# Patient Record
Sex: MALE | URBAN - METROPOLITAN AREA
[De-identification: names, ages, dates, MRNs, and addresses within clinical notes are randomized per-mention and may not be internally consistent; named-entity substitution may affect disease eponyms.]

---

## 2022-07-25 ENCOUNTER — TRANSFERRED RECORDS (OUTPATIENT)
Dept: HEALTH INFORMATION MANAGEMENT | Facility: CLINIC | Age: 27
End: 2022-07-25

## 2022-07-28 ENCOUNTER — MEDICAL CORRESPONDENCE (OUTPATIENT)
Dept: HEALTH INFORMATION MANAGEMENT | Facility: CLINIC | Age: 27
End: 2022-07-28

## 2022-07-29 ENCOUNTER — TRANSCRIBE ORDERS (OUTPATIENT)
Dept: OTHER | Age: 27
End: 2022-07-29

## 2022-07-29 DIAGNOSIS — H18.609 KERATOCONUS: Primary | ICD-10-CM

## 2022-08-01 ENCOUNTER — OFFICE VISIT (OUTPATIENT)
Dept: OPHTHALMOLOGY | Facility: CLINIC | Age: 27
End: 2022-08-01
Attending: OPHTHALMOLOGY
Payer: COMMERCIAL

## 2022-08-01 DIAGNOSIS — H10.13 ALLERGIC CONJUNCTIVITIS OF BOTH EYES: ICD-10-CM

## 2022-08-01 DIAGNOSIS — H18.623 UNSTABLE KERATOCONUS OF BOTH EYES: Primary | ICD-10-CM

## 2022-08-01 PROCEDURE — G0463 HOSPITAL OUTPT CLINIC VISIT: HCPCS | Mod: 25

## 2022-08-01 PROCEDURE — 99204 OFFICE O/P NEW MOD 45 MIN: CPT | Performed by: OPHTHALMOLOGY

## 2022-08-01 PROCEDURE — 92025 CPTRIZED CORNEAL TOPOGRAPHY: CPT | Performed by: OPHTHALMOLOGY

## 2022-08-01 RX ORDER — OLOPATADINE HYDROCHLORIDE 1 MG/ML
1 SOLUTION/ DROPS OPHTHALMIC 2 TIMES DAILY
Qty: 5 ML | Refills: 11 | Status: SHIPPED | OUTPATIENT
Start: 2022-08-01

## 2022-08-01 RX ORDER — DEXTROAMPHETAMINE SACCHARATE, AMPHETAMINE ASPARTATE MONOHYDRATE, DEXTROAMPHETAMINE SULFATE AND AMPHETAMINE SULFATE 3.75; 3.75; 3.75; 3.75 MG/1; MG/1; MG/1; MG/1
15 CAPSULE, EXTENDED RELEASE ORAL
COMMUNITY

## 2022-08-01 ASSESSMENT — SLIT LAMP EXAM - LIDS
COMMENTS: NORMAL
COMMENTS: NORMAL

## 2022-08-01 ASSESSMENT — CONF VISUAL FIELD
OD_NORMAL: 1
OS_NORMAL: 1
METHOD: COUNTING FINGERS

## 2022-08-01 ASSESSMENT — EXTERNAL EXAM - RIGHT EYE: OD_EXAM: NORMAL

## 2022-08-01 ASSESSMENT — EXTERNAL EXAM - LEFT EYE: OS_EXAM: NORMAL

## 2022-08-01 ASSESSMENT — TONOMETRY
IOP_METHOD: TONOPEN
OD_IOP_MMHG: 16
OS_IOP_MMHG: 16

## 2022-08-01 ASSESSMENT — CUP TO DISC RATIO
OD_RATIO: 0.4
OS_RATIO: 0.4

## 2022-08-01 ASSESSMENT — VISUAL ACUITY
OS_PH_CC: 20/25
OD_PH_CC: 20/25
OD_PH_CC+: -2
OS_CC: 20/25
CORRECTION_TYPE: GLASSES
OS_CC+: -2
OD_CC: 20/30
METHOD: SNELLEN - LINEAR
OS_PH_CC+: +2

## 2022-08-01 ASSESSMENT — REFRACTION_WEARINGRX
OS_SPHERE: -2.00
OS_AXIS: 105
OD_CYLINDER: +1.50
OD_AXIS: 088
OD_SPHERE: -2.00
SPECS_TYPE: SVL
OS_CYLINDER: +1.00

## 2022-08-01 NOTE — NURSING NOTE
Chief Complaints and History of Present Illnesses   Patient presents with     Keratoconus Evaluation     Chief Complaint(s) and History of Present Illness(es)     Keratoconus Evaluation               Comments     Pt states vision has been stable over the past few years. No eye pain today.  No flashes or floaters. No redness or dryness.     LUCAS Tamez August 1, 2022 1:45 PM

## 2022-08-01 NOTE — PROGRESS NOTES
Chief complaint   Referred for suspicion of keratoconus    Referring Provider: Robbin Dillon 26 year old male Pt states vision has been stable over the past few years. No eye pain today. No flashes or floaters. No redness or dryness.       Past ocular history   Prior eye surgery/laser/Trauma: No  CTL wearer:No  Glasses : glasses  Family Hx of eye disease: No    PMH   History reviewed. No pertinent past medical history.    PSH   History reviewed. No pertinent surgical history.    Meds   **  Current Outpatient Medications   Medication     amphetamine-dextroamphetamine (ADDERALL XR) 15 MG 24 hr capsule     No current facility-administered medications for this visit.       Labs   -    Imaging   Madhav    Topography thinnest CT OD Kmax OD Thinnest CT OS Kmax OS   08/1/22 510 47.9 509 48.1                   Drops Currently Taking   none    Assessment/Plan   # Keratoconus each eye  #Allegic conjunctivitis ou     -patient does not report recent change in the vision. He has chronic allergies which might be contributing to the GLENYS.    -Topography showing left eye worse than right eye.    -CXL discussed with the patient but rather wait and document a progression before proceeding with sx    Plan:  -Monitor q6M  -Patanol prescribed for allergy  -advise not to rub his eyes  -Follow with optom for hard contact lens fitting/refraction      Follow up:  Oph:Follow Up with topography, and follow with optom for contact lens testing in 2months      Attending Physician Attestation:  Complete documentation of historical and exam elements from today's encounter can be found in the full encounter summary report (not reduplicated in this progress note).  I personally obtained the chief complaint(s) and history of present illness.  I confirmed and edited as necessary the review of systems, past medical/surgical history, family history, social history, and examination findings as documented by others; and I examined the  patient myself.  I personally reviewed the relevant tests, images, and reports as documented above.  I formulated and edited as necessary the assessment and plan and discussed the findings and management plan with the patient and family. - David Mercado MD  /

## 2022-08-04 PROBLEM — H10.13 ALLERGIC CONJUNCTIVITIS OF BOTH EYES: Status: ACTIVE | Noted: 2022-08-04

## 2022-08-04 PROBLEM — H18.623 UNSTABLE KERATOCONUS OF BOTH EYES: Status: ACTIVE | Noted: 2022-08-04

## 2022-08-21 ENCOUNTER — HEALTH MAINTENANCE LETTER (OUTPATIENT)
Age: 27
End: 2022-08-21

## 2022-10-17 ENCOUNTER — OFFICE VISIT (OUTPATIENT)
Dept: OPTOMETRY | Facility: CLINIC | Age: 27
End: 2022-10-17

## 2022-10-17 DIAGNOSIS — H18.603 KERATOCONUS OF BOTH EYES: Primary | ICD-10-CM

## 2022-10-17 ASSESSMENT — REFRACTION_CURRENTRX
OS_BRAND: ONEFIT
OS_SPHERE: -1.50
OS_BRAND: BIOFINITY TORIC
OD_DIAMETER: 15.2
OD_SPHERE: -2.00
OS_DIAMETER: 15.2
OS_DIAMETER: 14.5
OD_BRAND: ONEFIT
OS_CYLINDER: -1.25
OS_BASECURVE: 8.0
OS_SPHERE: -1.00
OD_AXIS: 180
OD_BRAND: BIOFINITY TORIC
OD_SPHERE: -1.00
OD_DIAMETER: 14.5
OD_CYLINDER: -2.25
OD_BASECURVE: 8.7
OS_AXIS: 010
OD_BASECURVE: 7.9
OS_BASECURVE: 8.7

## 2022-10-17 ASSESSMENT — REFRACTION_MANIFEST
OD_CYLINDER: +2.25
OS_AXIS: 100
OD_SPHERE: -3.00
OS_CYLINDER: +1.25
OS_SPHERE: -2.25
OD_AXIS: 090

## 2022-10-17 ASSESSMENT — VISUAL ACUITY
CORRECTION_TYPE: CONTACTS
OD_CC: 20/20-3
METHOD: SNELLEN - LINEAR
OS_CC: 20/20-2

## 2022-10-17 ASSESSMENT — SLIT LAMP EXAM - LIDS
COMMENTS: NORMAL
COMMENTS: NORMAL

## 2022-10-17 ASSESSMENT — CONF VISUAL FIELD
OS_INFERIOR_TEMPORAL_RESTRICTION: 0
OS_SUPERIOR_TEMPORAL_RESTRICTION: 0
OS_INFERIOR_NASAL_RESTRICTION: 0
OS_SUPERIOR_NASAL_RESTRICTION: 0
OS_NORMAL: 1

## 2022-10-17 ASSESSMENT — REFRACTION_WEARINGRX
OD_AXIS: 095
OD_SPHERE: -3.00
OS_AXIS: 100
OD_CYLINDER: +2.25
OS_CYLINDER: +1.25
OS_SPHERE: -2.25

## 2022-10-17 ASSESSMENT — EXTERNAL EXAM - RIGHT EYE: OD_EXAM: NORMAL

## 2022-10-17 ASSESSMENT — EXTERNAL EXAM - LEFT EYE: OS_EXAM: NORMAL

## 2022-10-17 NOTE — PROGRESS NOTES
A/P  1.) Mild keratoconus OU  -Very mild, inferior steepening left eye>right eye but still with regular WTR cyl pattern  -Seeing well in glasses, BCVA 20/25 right, 20/20 left  -No previous h/o CL wear  -Was eval'd by cornea dept here but recommended for 6 month eval prior to CXL consideration  -Poor vision in soft torics today (likely influenced by DILLON's)  -Excellent vision in scleral lens but does require significant front surface toric    Reviewed findings with pt and expectations for vision correction. He is very motivated to do CXL asap if it can prevent vision from getting worse. He did ask about possible LASIK combined with CXL. I would recommend he get a second opinion on whether he can get it done ASAP or now. No soft lenses recommended at this time (reconsider s/p CXL), but can consider scleral lens fit at any time. I did review he largely sees well in glasses so there is not need for hard CL fit at this time unless he desires to wear contacts or prefers the crispness from trial frame today.     Hold on CL's pending CXL second opinion. May be candidate for CXL + refractive surgery. F/u here after CXL

## 2022-11-21 ENCOUNTER — HEALTH MAINTENANCE LETTER (OUTPATIENT)
Age: 27
End: 2022-11-21

## 2023-02-01 ENCOUNTER — OFFICE VISIT (OUTPATIENT)
Dept: OPHTHALMOLOGY | Facility: CLINIC | Age: 28
End: 2023-02-01
Attending: OPHTHALMOLOGY
Payer: COMMERCIAL

## 2023-02-01 DIAGNOSIS — H18.623 UNSTABLE KERATOCONUS OF BOTH EYES: Primary | ICD-10-CM

## 2023-02-01 PROCEDURE — G0463 HOSPITAL OUTPT CLINIC VISIT: HCPCS

## 2023-02-01 PROCEDURE — 99213 OFFICE O/P EST LOW 20 MIN: CPT | Performed by: OPHTHALMOLOGY

## 2023-02-01 PROCEDURE — 92025 CPTRIZED CORNEAL TOPOGRAPHY: CPT | Performed by: OPHTHALMOLOGY

## 2023-02-01 RX ORDER — METHYLPHENIDATE HYDROCHLORIDE 10 MG/1
TABLET ORAL
COMMUNITY
Start: 2023-01-29

## 2023-02-01 ASSESSMENT — CONF VISUAL FIELD
OS_INFERIOR_TEMPORAL_RESTRICTION: 0
OS_NORMAL: 1
OD_NORMAL: 1
OS_SUPERIOR_TEMPORAL_RESTRICTION: 0
OD_INFERIOR_TEMPORAL_RESTRICTION: 0
OS_INFERIOR_NASAL_RESTRICTION: 0
OD_INFERIOR_NASAL_RESTRICTION: 0
METHOD: COUNTING FINGERS
OS_SUPERIOR_NASAL_RESTRICTION: 0
OD_SUPERIOR_NASAL_RESTRICTION: 0
OD_SUPERIOR_TEMPORAL_RESTRICTION: 0

## 2023-02-01 ASSESSMENT — REFRACTION_WEARINGRX
OD_SPHERE: -3.00
OS_AXIS: 100
OD_AXIS: 090
OS_CYLINDER: +1.25
OD_CYLINDER: +2.25
OS_SPHERE: -2.25

## 2023-02-01 ASSESSMENT — VISUAL ACUITY
OS_CC: 20/20
CORRECTION_TYPE: GLASSES
METHOD: SNELLEN - LINEAR
OD_CC: 20/20

## 2023-02-01 ASSESSMENT — TONOMETRY
IOP_METHOD: ICARE
OD_IOP_MMHG: 15
OS_IOP_MMHG: 18

## 2023-02-01 ASSESSMENT — EXTERNAL EXAM - LEFT EYE: OS_EXAM: NORMAL

## 2023-02-01 ASSESSMENT — SLIT LAMP EXAM - LIDS
COMMENTS: NORMAL
COMMENTS: NORMAL

## 2023-02-01 ASSESSMENT — EXTERNAL EXAM - RIGHT EYE: OD_EXAM: NORMAL

## 2023-02-01 NOTE — NURSING NOTE
Chief Complaints and History of Present Illnesses   Patient presents with     Keratoconus Follow Up     Chief Complaint(s) and History of Present Illness(es)     Keratoconus Follow Up            Laterality: both eyes    Associated symptoms: Negative for flashes and floaters    Treatments tried: no treatments    Pain scale: 0/10          Comments    Keratoconus follow up.  The patient notes his updated glasses are working well.  He reports he tried contact lenses and he couldn't get the scleral lenses in the eye.  Inna Rose, COA, COA 12:35 PM 02/01/2023

## 2023-02-01 NOTE — PROGRESS NOTES
Chief complaint   Referred for suspicion of keratoconus    Referring Provider: Robbin Dillon 27 year old male Pt states vision has been stable over the past few years. No eye pain today. No flashes or floaters. No redness or dryness.     Interval hx Keratoconus follow up. The patient notes his updated glasses are working well. He reports he tried contact lenses and he couldn't get the scleral lenses in the eye.       Past ocular history   Prior eye surgery/laser/Trauma: No  CTL wearer:No  Glasses : glasses  Family Hx of eye disease: No    PMH   History reviewed. No pertinent past medical history.    PSH   History reviewed. No pertinent surgical history.    Meds   **  Current Outpatient Medications   Medication     amphetamine-dextroamphetamine (ADDERALL XR) 15 MG 24 hr capsule     methylphenidate (RITALIN) 10 MG tablet     olopatadine (PATANOL) 0.1 % ophthalmic solution     No current facility-administered medications for this visit.       Labs   -    Imaging   Madhav    Topography thinnest CT OD Kmax OD Thinnest CT OS Kmax OS   08/1/22 510 47.9 509 48.1                   Drops Currently Taking   none    Assessment/Plan   # unstable Keratoconus each eye stage 1  #Allegic conjunctivitis ou     -patient does not report recent change in the vision. He has chronic allergies which might be contributing to the GLENYS.    -Topography showing left eye worse than right eye. But same since last exam    -CXL discussed with the patient but rather wait and document a progression before proceeding with sx ( patient not candidate for Rx sx, but ICL can be considered if stable for karl years    Plan:  -repeat imaging in 1 year  -advise not to rub his eyes      Follow up:  Oph: yearly exam with madhav    Attending Physician Attestation:  Complete documentation of historical and exam elements from today's encounter can be found in the full encounter summary report (not reduplicated in this progress note).  I personally  obtained the chief complaint(s) and history of present illness.  I confirmed and edited as necessary the review of systems, past medical/surgical history, family history, social history, and examination findings as documented by others; and I examined the patient myself.  I personally reviewed the relevant tests, images, and reports as documented above.  I formulated and edited as necessary the assessment and plan and discussed the findings and management plan with the patient and family. - David Mercado MD

## 2023-09-17 ENCOUNTER — HEALTH MAINTENANCE LETTER (OUTPATIENT)
Age: 28
End: 2023-09-17

## 2024-11-09 ENCOUNTER — HEALTH MAINTENANCE LETTER (OUTPATIENT)
Age: 29
End: 2024-11-09

## 2025-02-27 ENCOUNTER — TELEPHONE (OUTPATIENT)
Dept: OPHTHALMOLOGY | Facility: CLINIC | Age: 30
End: 2025-02-27
Payer: COMMERCIAL

## 2025-02-27 NOTE — TELEPHONE ENCOUNTER
[facility] Call Center    Phone Message    May a detailed message be left on voicemail: yes     Reason for Call: Other: pt is asking if he can get his glasses RX at upcoming appt, Please contact pt to discuss options. Thank you      Action Taken: Message routed to:  [facility] ([facility]): eye    Travel Screening: Not Applicable     Date of Service:

## 2025-03-10 ENCOUNTER — OFFICE VISIT (OUTPATIENT)
Dept: OPHTHALMOLOGY | Facility: CLINIC | Age: 30
End: 2025-03-10
Attending: OPHTHALMOLOGY
Payer: COMMERCIAL

## 2025-03-10 DIAGNOSIS — H18.623 UNSTABLE KERATOCONUS OF BOTH EYES: Primary | ICD-10-CM

## 2025-03-10 PROCEDURE — 92015 DETERMINE REFRACTIVE STATE: CPT

## 2025-03-10 PROCEDURE — 99212 OFFICE O/P EST SF 10 MIN: CPT | Performed by: OPHTHALMOLOGY

## 2025-03-10 PROCEDURE — 92025 CPTRIZED CORNEAL TOPOGRAPHY: CPT | Performed by: OPHTHALMOLOGY

## 2025-03-10 ASSESSMENT — EXTERNAL EXAM - LEFT EYE: OS_EXAM: NORMAL

## 2025-03-10 ASSESSMENT — REFRACTION_WEARINGRX
OD_CYLINDER: +2.25
OS_SPHERE: -2.25
OD_AXIS: 090
OS_CYLINDER: +1.25
OD_SPHERE: -3.00
OS_AXIS: 100

## 2025-03-10 ASSESSMENT — CONF VISUAL FIELD
OS_INFERIOR_NASAL_RESTRICTION: 0
OD_SUPERIOR_NASAL_RESTRICTION: 0
METHOD: COUNTING FINGERS
OD_NORMAL: 1
OS_SUPERIOR_TEMPORAL_RESTRICTION: 0
OD_INFERIOR_TEMPORAL_RESTRICTION: 0
OD_SUPERIOR_TEMPORAL_RESTRICTION: 0
OS_NORMAL: 1
OS_SUPERIOR_NASAL_RESTRICTION: 0
OS_INFERIOR_TEMPORAL_RESTRICTION: 0
OD_INFERIOR_NASAL_RESTRICTION: 0

## 2025-03-10 ASSESSMENT — VISUAL ACUITY
OD_CC: 20/25
OD_CC+: -1
CORRECTION_TYPE: GLASSES
METHOD: SNELLEN - LINEAR
OS_CC: 20/25

## 2025-03-10 ASSESSMENT — EXTERNAL EXAM - RIGHT EYE: OD_EXAM: NORMAL

## 2025-03-10 ASSESSMENT — SLIT LAMP EXAM - LIDS
COMMENTS: NORMAL
COMMENTS: NORMAL

## 2025-03-10 ASSESSMENT — REFRACTION_MANIFEST
OS_CYLINDER: +1.75
OS_SPHERE: -2.25
OD_SPHERE: -3.25
OD_CYLINDER: +2.75
OD_AXIS: 088
OS_AXIS: 105

## 2025-03-10 ASSESSMENT — TONOMETRY
OD_IOP_MMHG: 13
OS_IOP_MMHG: 14
IOP_METHOD: ICARE

## 2025-03-10 NOTE — PROGRESS NOTES
Chief complaint   Referred for suspicion of keratoconus    Referring Provider: Robbin Dillon 29 year old male Pt states vision has been stable over the past few years. No eye pain today. No flashes or floaters. No redness or dryness.     Interval hx 03/10/2025  Chief Complaint(s) and History of Present Illness(es)       Annual Eye Exam    In both eyes.  Since onset it is gradually worsening.  Associated symptoms include Negative for eye pain, flashes and floaters.  Treatments tried include no treatments.                       Past ocular history   Prior eye surgery/laser/Trauma: No  CTL wearer:No  Glasses : glasses  Family Hx of eye disease: No    PMH   No past medical history on file.    PSH   No past surgical history on file.    Meds   **  Current Outpatient Medications   Medication Sig Dispense Refill    amphetamine-dextroamphetamine (ADDERALL XR) 15 MG 24 hr capsule Take 15 mg by mouth      methylphenidate (RITALIN) 10 MG tablet TAKE 1 TABLET BY MOUTH TWICE DAILY FOR 7 DAYS THEN MAY INCREASE TO 1 TABLET BY MOUTH THREE TIMES DAILY      olopatadine (PATANOL) 0.1 % ophthalmic solution Place 1 drop into both eyes 2 times daily 5 mL 11     No current facility-administered medications for this visit.       Labs   -    Imaging   Madhav    Topography thinnest CT OD Kmax OD Thinnest CT OS Kmax OS   08/1/22 510 47.9 509 48.1                   Drops Currently Taking   none    Assessment/Plan   # unstable Keratoconus each eye stage 1  #Allegic conjunctivitis ou     -patient does not report recent change in the vision. He has chronic allergies which might be contributing to the GLENYS.    -today madhav each eye is stable compared to 2022 ok to hold on CXL for now  Discussed CXL with PRK. It is an option since SE is less than 3    Plan:  -repeat imaging in 1 year  -advise not to rub his eyes      Follow up:  Oph: yearly exam with madhav    Attending Physician Attestation:  Complete documentation of historical and  exam elements from today's encounter can be found in the full encounter summary report (not reduplicated in this progress note).  I personally obtained the chief complaint(s) and history of present illness.  I confirmed and edited as necessary the review of systems, past medical/surgical history, family history, social history, and examination findings as documented by others; and I examined the patient myself.  I personally reviewed the relevant tests, images, and reports as documented above.  I formulated and edited as necessary the assessment and plan and discussed the findings and management plan with the patient and family. - David Mercado MD

## 2025-08-02 ENCOUNTER — HEALTH MAINTENANCE LETTER (OUTPATIENT)
Age: 30
End: 2025-08-02